# Patient Record
Sex: MALE | Race: WHITE
[De-identification: names, ages, dates, MRNs, and addresses within clinical notes are randomized per-mention and may not be internally consistent; named-entity substitution may affect disease eponyms.]

---

## 2022-11-26 ENCOUNTER — HOSPITAL ENCOUNTER (EMERGENCY)
Dept: HOSPITAL 47 - EC | Age: 37
Discharge: HOME | End: 2022-11-26
Payer: COMMERCIAL

## 2022-11-26 VITALS
SYSTOLIC BLOOD PRESSURE: 111 MMHG | RESPIRATION RATE: 16 BRPM | HEART RATE: 58 BPM | DIASTOLIC BLOOD PRESSURE: 70 MMHG | TEMPERATURE: 98.2 F

## 2022-11-26 DIAGNOSIS — S42.011A: Primary | ICD-10-CM
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
## 2022-11-26 PROCEDURE — 99283 EMERGENCY DEPT VISIT LOW MDM: CPT
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
## 2022-11-26 NOTE — XR
EXAMINATION TYPE: XR shoulder complete RT

 

DATE OF EXAM: 11/26/2022

 

COMPARISON: NONE

 

HISTORY: Fall. Pain

 

TECHNIQUE: 3 views

 

FINDINGS: There is comminuted fracture of the midshaft of the right clavicle. There is a persistent i
nferior displacement of the lateral major fragment. The glenohumeral joint is intact. Joint spaces ar
e normal.

 

IMPRESSION: Displaced comminuted fracture of the clavicle.

## 2022-11-26 NOTE — XR
EXAMINATION TYPE: XR clavicle RT

 

DATE OF EXAM: 11/26/2022

 

COMPARISON: NONE

 

HISTORY: Fall. Pain

 

TECHNIQUE:

 

FINDINGS: There is comminuted midshaft fracture of the right clavicle. There is 100% inferior displac
ement of the lateral major fragments 13 mm. No dislocation at the AC joint.

 

IMPRESSION: Acute comminuted displaced clavicle fracture.